# Patient Record
Sex: MALE | Race: WHITE | Employment: OTHER | URBAN - METROPOLITAN AREA
[De-identification: names, ages, dates, MRNs, and addresses within clinical notes are randomized per-mention and may not be internally consistent; named-entity substitution may affect disease eponyms.]

---

## 2018-08-22 RX ORDER — OMEGA-3-ACID ETHYL ESTERS 1 G/1
1 CAPSULE, LIQUID FILLED ORAL 2 TIMES DAILY
COMMUNITY

## 2018-08-23 ENCOUNTER — HOSPITAL ENCOUNTER (OUTPATIENT)
Facility: CLINIC | Age: 78
Discharge: HOME | End: 2018-08-23
Attending: OPHTHALMOLOGY | Admitting: OPHTHALMOLOGY
Payer: MEDICARE

## 2018-08-23 ENCOUNTER — ANESTHESIA EVENT (OUTPATIENT)
Dept: SURGERY | Facility: CLINIC | Age: 78
End: 2018-08-23
Payer: MEDICARE

## 2018-08-23 ENCOUNTER — ANESTHESIA (OUTPATIENT)
Dept: SURGERY | Facility: CLINIC | Age: 78
End: 2018-08-23
Payer: MEDICARE

## 2018-08-23 VITALS
DIASTOLIC BLOOD PRESSURE: 95 MMHG | TEMPERATURE: 97.6 F | SYSTOLIC BLOOD PRESSURE: 148 MMHG | HEIGHT: 70 IN | BODY MASS INDEX: 28.77 KG/M2 | WEIGHT: 201 LBS | RESPIRATION RATE: 18 BRPM | OXYGEN SATURATION: 99 %

## 2018-08-23 LAB — GLUCOSE BLDC GLUCOMTR-MCNC: 128 MG/DL (ref 70–99)

## 2018-08-23 PROCEDURE — 25000128 H RX IP 250 OP 636: Performed by: NURSE ANESTHETIST, CERTIFIED REGISTERED

## 2018-08-23 PROCEDURE — 27210794 ZZH OR GENERAL SUPPLY STERILE: Performed by: OPHTHALMOLOGY

## 2018-08-23 PROCEDURE — 36000101 ZZH EYE SURGERY LEVEL 3 1ST 30 MIN: Performed by: OPHTHALMOLOGY

## 2018-08-23 PROCEDURE — 25000128 H RX IP 250 OP 636: Performed by: ANESTHESIOLOGY

## 2018-08-23 PROCEDURE — 82962 GLUCOSE BLOOD TEST: CPT

## 2018-08-23 PROCEDURE — 71000028 ZZH EYE RECOVERY PHASE 2 EACH 15 MINS: Performed by: OPHTHALMOLOGY

## 2018-08-23 PROCEDURE — 25000128 H RX IP 250 OP 636: Performed by: OPHTHALMOLOGY

## 2018-08-23 PROCEDURE — 93005 ELECTROCARDIOGRAM TRACING: CPT

## 2018-08-23 PROCEDURE — 40000170 ZZH STATISTIC PRE-PROCEDURE ASSESSMENT II: Performed by: OPHTHALMOLOGY

## 2018-08-23 PROCEDURE — 93010 ELECTROCARDIOGRAM REPORT: CPT | Performed by: INTERNAL MEDICINE

## 2018-08-23 PROCEDURE — 99207 ZZC NON-BILLABLE SERV PER CHARTING: CPT | Performed by: NURSE PRACTITIONER

## 2018-08-23 PROCEDURE — 36000102 ZZH EYE SURGERY LEVEL 3 EA 15 ADDTL MIN: Performed by: OPHTHALMOLOGY

## 2018-08-23 PROCEDURE — 37000009 ZZH ANESTHESIA TECHNICAL FEE, EACH ADDTL 15 MIN: Performed by: OPHTHALMOLOGY

## 2018-08-23 PROCEDURE — 25000125 ZZHC RX 250: Performed by: OPHTHALMOLOGY

## 2018-08-23 PROCEDURE — 37000008 ZZH ANESTHESIA TECHNICAL FEE, 1ST 30 MIN: Performed by: OPHTHALMOLOGY

## 2018-08-23 PROCEDURE — V2632 POST CHMBR INTRAOCULAR LENS: HCPCS | Performed by: OPHTHALMOLOGY

## 2018-08-23 DEVICE — EYE IMP IOL AMO PCL TECNIS ZCB00 17.5: Type: IMPLANTABLE DEVICE | Site: EYE | Status: FUNCTIONAL

## 2018-08-23 RX ORDER — PROPARACAINE HYDROCHLORIDE 5 MG/ML
1 SOLUTION/ DROPS OPHTHALMIC ONCE
Status: DISCONTINUED | OUTPATIENT
Start: 2018-08-23 | End: 2018-08-23 | Stop reason: HOSPADM

## 2018-08-23 RX ORDER — PHENYLEPHRINE HYDROCHLORIDE 25 MG/ML
1 SOLUTION/ DROPS OPHTHALMIC
Status: COMPLETED | OUTPATIENT
Start: 2018-08-23 | End: 2018-08-23

## 2018-08-23 RX ORDER — PROPARACAINE HYDROCHLORIDE 5 MG/ML
1 SOLUTION/ DROPS OPHTHALMIC ONCE
Status: COMPLETED | OUTPATIENT
Start: 2018-08-23 | End: 2018-08-23

## 2018-08-23 RX ORDER — SODIUM CHLORIDE, SODIUM LACTATE, POTASSIUM CHLORIDE, CALCIUM CHLORIDE 600; 310; 30; 20 MG/100ML; MG/100ML; MG/100ML; MG/100ML
500 INJECTION, SOLUTION INTRAVENOUS CONTINUOUS
Status: DISCONTINUED | OUTPATIENT
Start: 2018-08-23 | End: 2018-08-23 | Stop reason: HOSPADM

## 2018-08-23 RX ORDER — ONDANSETRON 2 MG/ML
INJECTION INTRAMUSCULAR; INTRAVENOUS PRN
Status: DISCONTINUED | OUTPATIENT
Start: 2018-08-23 | End: 2018-08-23

## 2018-08-23 RX ORDER — FENTANYL CITRATE 50 UG/ML
INJECTION, SOLUTION INTRAMUSCULAR; INTRAVENOUS PRN
Status: DISCONTINUED | OUTPATIENT
Start: 2018-08-23 | End: 2018-08-23

## 2018-08-23 RX ORDER — LIDOCAINE HYDROCHLORIDE 10 MG/ML
INJECTION, SOLUTION EPIDURAL; INFILTRATION; INTRACAUDAL; PERINEURAL PRN
Status: DISCONTINUED | OUTPATIENT
Start: 2018-08-23 | End: 2018-08-23 | Stop reason: HOSPADM

## 2018-08-23 RX ORDER — DICLOFENAC SODIUM 1 MG/ML
1 SOLUTION/ DROPS OPHTHALMIC
Status: COMPLETED | OUTPATIENT
Start: 2018-08-23 | End: 2018-08-23

## 2018-08-23 RX ORDER — CYCLOPENTOLATE HYDROCHLORIDE 10 MG/ML
1 SOLUTION/ DROPS OPHTHALMIC
Status: COMPLETED | OUTPATIENT
Start: 2018-08-23 | End: 2018-08-23

## 2018-08-23 RX ORDER — MOXIFLOXACIN 5 MG/ML
1 SOLUTION/ DROPS OPHTHALMIC
Status: COMPLETED | OUTPATIENT
Start: 2018-08-23 | End: 2018-08-23

## 2018-08-23 RX ORDER — TROPICAMIDE 10 MG/ML
1 SOLUTION/ DROPS OPHTHALMIC
Status: COMPLETED | OUTPATIENT
Start: 2018-08-23 | End: 2018-08-23

## 2018-08-23 RX ORDER — BALANCED SALT SOLUTION 6.4; .75; .48; .3; 3.9; 1.7 MG/ML; MG/ML; MG/ML; MG/ML; MG/ML; MG/ML
SOLUTION OPHTHALMIC PRN
Status: DISCONTINUED | OUTPATIENT
Start: 2018-08-23 | End: 2018-08-23 | Stop reason: HOSPADM

## 2018-08-23 RX ORDER — TETRACAINE HYDROCHLORIDE 5 MG/ML
SOLUTION OPHTHALMIC PRN
Status: DISCONTINUED | OUTPATIENT
Start: 2018-08-23 | End: 2018-08-23 | Stop reason: HOSPADM

## 2018-08-23 RX ORDER — PROPOFOL 10 MG/ML
INJECTION, EMULSION INTRAVENOUS PRN
Status: DISCONTINUED | OUTPATIENT
Start: 2018-08-23 | End: 2018-08-23

## 2018-08-23 RX ADMIN — DICLOFENAC SODIUM 1 DROP: 1 SOLUTION OPHTHALMIC at 07:48

## 2018-08-23 RX ADMIN — CYCLOPENTOLATE HYDROCHLORIDE 1 DROP: 10 SOLUTION/ DROPS OPHTHALMIC at 07:29

## 2018-08-23 RX ADMIN — DICLOFENAC SODIUM 1 DROP: 1 SOLUTION OPHTHALMIC at 07:29

## 2018-08-23 RX ADMIN — PHENYLEPHRINE HYDROCHLORIDE 1 DROP: 2.5 SOLUTION/ DROPS OPHTHALMIC at 07:48

## 2018-08-23 RX ADMIN — FENTANYL CITRATE 50 MCG: 50 INJECTION, SOLUTION INTRAMUSCULAR; INTRAVENOUS at 08:43

## 2018-08-23 RX ADMIN — PHENYLEPHRINE HYDROCHLORIDE 1 DROP: 2.5 SOLUTION/ DROPS OPHTHALMIC at 07:29

## 2018-08-23 RX ADMIN — MIDAZOLAM 1 MG: 1 INJECTION INTRAMUSCULAR; INTRAVENOUS at 08:43

## 2018-08-23 RX ADMIN — ONDANSETRON 4 MG: 2 INJECTION INTRAMUSCULAR; INTRAVENOUS at 08:49

## 2018-08-23 RX ADMIN — TROPICAMIDE 1 DROP: 10 SOLUTION/ DROPS OPHTHALMIC at 07:48

## 2018-08-23 RX ADMIN — PROPOFOL 30 MG: 10 INJECTION, EMULSION INTRAVENOUS at 08:49

## 2018-08-23 RX ADMIN — DICLOFENAC SODIUM 1 DROP: 1 SOLUTION OPHTHALMIC at 07:38

## 2018-08-23 RX ADMIN — CYCLOPENTOLATE HYDROCHLORIDE 1 DROP: 10 SOLUTION/ DROPS OPHTHALMIC at 07:48

## 2018-08-23 RX ADMIN — MOXIFLOXACIN 1 DROP: 5 SOLUTION/ DROPS OPHTHALMIC at 07:48

## 2018-08-23 RX ADMIN — CYCLOPENTOLATE HYDROCHLORIDE 1 DROP: 10 SOLUTION/ DROPS OPHTHALMIC at 07:38

## 2018-08-23 RX ADMIN — MOXIFLOXACIN 1 DROP: 5 SOLUTION/ DROPS OPHTHALMIC at 07:29

## 2018-08-23 RX ADMIN — PHENYLEPHRINE HYDROCHLORIDE 1 DROP: 2.5 SOLUTION/ DROPS OPHTHALMIC at 07:38

## 2018-08-23 RX ADMIN — TROPICAMIDE 1 DROP: 10 SOLUTION/ DROPS OPHTHALMIC at 07:38

## 2018-08-23 RX ADMIN — TROPICAMIDE 1 DROP: 10 SOLUTION/ DROPS OPHTHALMIC at 07:29

## 2018-08-23 RX ADMIN — MOXIFLOXACIN 1 DROP: 5 SOLUTION/ DROPS OPHTHALMIC at 07:38

## 2018-08-23 RX ADMIN — PROPARACAINE HYDROCHLORIDE 1 DROP: 5 SOLUTION/ DROPS OPHTHALMIC at 07:29

## 2018-08-23 RX ADMIN — SODIUM CHLORIDE, POTASSIUM CHLORIDE, SODIUM LACTATE AND CALCIUM CHLORIDE 500 ML: 600; 310; 30; 20 INJECTION, SOLUTION INTRAVENOUS at 08:00

## 2018-08-23 ASSESSMENT — LIFESTYLE VARIABLES: TOBACCO_USE: 0

## 2018-08-23 ASSESSMENT — COPD QUESTIONNAIRES: COPD: 0

## 2018-08-23 ASSESSMENT — ENCOUNTER SYMPTOMS
ORTHOPNEA: 0
DYSRHYTHMIAS: 0
SEIZURES: 1

## 2018-08-23 NOTE — PROGRESS NOTES
1024hr - Pt transferred to family car in w/chair by CNA. Pt denied any pain at time of dc.   Pt and his wife verbalized that they were heading straight to ER at [facility] [place]  DC/follow up teaching was completed with Pts Spouse earlier on. Pts family

## 2018-08-23 NOTE — PROGRESS NOTES
Immediate follow up of 12L EKG discussedw/pt and his family present by ER ANP . Pt opted to follow up in [facility].

## 2018-08-23 NOTE — ANESTHESIA PREPROCEDURE EVALUATION
Procedure: Procedure(s):  PHACOEMULSIFICATION CLEAR CORNEA WITH STANDARD INTRAOCULAR LENS IMPLANT  Preop diagnosis: RIGHT EYE CATARACT    No Known Allergies  Past Medical History:   Diagnosis Date     Basal cell carcinoma of skin      Bipolar 2 disorder (H)      BPH (benign prostatic hyperplasia)      Diabetes (H)      Epilepsy (H)      Hyperlipemia      Hypertension      Recurrent seizures (H)      Past Surgical History:   Procedure Laterality Date     CIRCUMCISION  12/19/2001     ENT SURGERY      tonsillectomy     excision of basal cell carcinoma       excision of squamous cell carcinoma       HERNIA REPAIR       Social History   Substance Use Topics     Smoking status: Former Smoker     Quit date: 1/1/1968     Smokeless tobacco: Never Used     Alcohol use Yes      Comment: 1 can of beer per week     Prior to Admission medications    Medication Sig Start Date End Date Taking? Authorizing Provider   ASPIRIN PO Take 81 mg by mouth daily   Yes Reported, Patient   Docusate Sodium (COLACE PO) Take 100 mg by mouth 2 times daily   Yes Reported, Patient   DOXEPIN HCL PO Take 10 mg by mouth At Bedtime   Yes Reported, Patient   LISINOPRIL PO Take 10 mg by mouth daily   Yes Reported, Patient   METFORMIN HCL PO Take 1,000 mg by mouth 2 times daily (with meals)   Yes Reported, Patient   OLANZAPINE PO Take 10 mg by mouth daily   Yes Reported, Patient   omega-3 acid ethyl esters (LOVAZA) 1 g capsule Take 1 g by mouth 2 times daily   Yes Reported, Patient   PHENYTOIN PO Take 100 mg by mouth 2 times daily   Yes Reported, Patient   psyllium (METAMUCIL) 58.6 % POWD Take by mouth daily   Yes Reported, Patient   SIMVASTATIN PO Take 40 mg by mouth At Bedtime   Yes Reported, Patient   Multiple Vitamins-Minerals (CENTRUM SILVER PO)     Reported, Patient   TAMSULOSIN HCL PO Take 0.4 mg by mouth    Reported, Patient     Current Facility-Administered Medications Ordered in Epic   Medication Dose Route Frequency Last Rate Last Dose      lactated ringers infusion  500 mL Intravenous Continuous 25 mL/hr at 08/23/18 0800 500 mL at 08/23/18 0800     lidocaine (AKTEN) ophthalmic gel 0.5 mL  0.5 mL Ophthalmic Once         lidocaine 1 % 1 mL  1 mL Other Q1H PRN         povidone-iodine 5 % ophthalmic solution 1 drop  1 drop Ophthalmic Once         proparacaine (ALCAINE) 0.5 % ophthalmic solution 1 drop  1 drop Ophthalmic Once         sodium chloride (PF) 0.9% PF flush 3 mL  3 mL Intracatheter Q1H PRN         sodium chloride (PF) 0.9% PF flush 3 mL  3 mL Intracatheter Q8H         No current Epic-ordered outpatient prescriptions on file.       lactated ringers 500 mL (08/23/18 0800)     Wt Readings from Last 1 Encounters:   08/22/18 91.2 kg (201 lb)     Temp Readings from Last 1 Encounters:   08/23/18 36.4  C (97.6  F) (Temporal)     BP Readings from Last 6 Encounters:   08/23/18 (!) 164/94     Pulse Readings from Last 4 Encounters:   No data found for Pulse     Resp Readings from Last 1 Encounters:   08/23/18 18     SpO2 Readings from Last 1 Encounters:   08/23/18 100%     RECENT LABS:     ECG:     STRESS ECHO:   Final Impression:  1. Normal baseline EKG.   2. Normal Giorgi protocol stress test.   3. Negative stress echocardiogram without findings of ischemic   cardiomyopathy.   4. Aortic valve sclerosis without stenosis.   5. Mild enlargement of the ascending aorta at 4.0 cm.     Anesthesia Evaluation     . Pt has had prior anesthetic.     No history of anesthetic complications          ROS/MED HX    ENT/Pulmonary:      (-) tobacco use, asthma, COPD, sleep apnea and recent URI   Neurologic:     (+)seizures last seizure: 20 yrs ago    (-) CVA   Cardiovascular:     (+) Dyslipidemia, hypertension----. : . . . :. .      (-) angina, CAD, orthopnea/PND, syncope, arrhythmias, irregular heartbeat/palpitations, valvular problems/murmurs and angina   METS/Exercise Tolerance:  >4 METS   Hematologic:         Musculoskeletal:         GI/Hepatic:        (-) GERD and  liver disease   Renal/Genitourinary:     (+) BPH,    (-) renal disease   Endo:     (+) type II DM Not using insulin .   (-) thyroid disease   Psychiatric:     (+) psychiatric history bipolar      Infectious Disease:         Malignancy:   (+) Malignancy History of Skin          Other:                     Physical Exam      Airway   Mallampati: II  TM distance: >3 FB  Neck ROM: full    Dental   (+) caps    Cardiovascular   Rhythm and rate: regular and normal  (-) no murmur    Pulmonary    breath sounds clear to auscultation(-) no wheezes                    Anesthesia Plan      History & Physical Review  History and physical reviewed and following examination; no interval change.    ASA Status:  3 .    NPO Status:  > 8 hours    Plan for MAC Reason for MAC:  Procedure to face, neck, head or breast  PONV prophylaxis:  Ondansetron (or other 5HT-3)       Postoperative Care  Postoperative pain management:  Multi-modal analgesia.      Consents  Anesthetic plan, risks, benefits and alternatives discussed with:  Patient..

## 2018-08-23 NOTE — PROGRESS NOTES
0940hr - MDA Jonah now rounding on pt - arrhythmia findings noted intraOperatively discussed.  12L EKG ordered.  0950hr - 12L EKG indicative of Atrial Flutter.  1000hr - ER Nurse Practitioner now rounding on pt re: findings of 12LEKG. Pts wife present with pt.

## 2018-08-23 NOTE — DISCHARGE INSTRUCTIONS
[facility] Anesthesia Eye Care Center Discharge  Instructions  Anesthesia (Eye Care Center)   Adult Discharge Instructions    For 24 hours after surgery    1. Get plenty of rest.  Make arrangements to have a responsible adult stay with you for at least 24 hours after you leave the hospital.  2. Do not drive or use heavy equipment for 24 hours.    3. Do not drink alcohol for 24 hours.  4. Do not sign legal documents or make important decisions for 24 hours.  5. Avoid strenuous or risky activities. You may feel lightheaded.  If so, sit for a few minutes before standing.  Have someone help you get up.   6. Conscious sedation patients may resume a regular diet..  7. Any questions of medical nature, call your physician.    [facility]  Cataract Surgery Discharge Instructions  [facility] MD TRACY Harkins MD J. Hasan, MD C. Nichols, MD J. O'Neill, MD S. Schaefer, MD J. Stephens, MD        Start using drops when you arrive at home today    Ofloxacin (Tan top) - one drop in surgical eye 3 times per day until gone.   Prednisolone acetate 1.0% (pink or white top) - one drop in surgical eye 3 times per day until gone.  Ketorolac or Diclofenac (Grey top) - one drop in surgical eye 3 times per day until gone.   (Dr. Bee's patients should use all drops 4 times daily)      Place shield over surgical eye at bedtime for 3 nights.      The eye will feel itchy, scratchy, and vision will be blurred, you may take Tylenol for the scratchy feeling if this is bothersome.      No eye rubbing or swimming for I week.      You may resume all prescription medications as directed by your primary doctor.      Call if increasing pain, progressively worsening vision or worsening redness of surgical eye.      On-call doctor can be reached at [phone].

## 2018-08-23 NOTE — IP AVS SNAPSHOT
[facility]    [address]    [place] [address]    Phone:  [phone]    Fax:  [phone]                                       After Visit Summary   8/23/2018    Riki Sung    MRN: [id number]           After Visit Summary Signature Page     I have received my discharge instructions, and my questions have been answered. I have discussed any challenges I see with this plan with the nurse or doctor.    ..........................................................................................................................................  Patient/Patient Representative Signature      ..........................................................................................................................................  Patient Representative Print Name and Relationship to Patient    ..................................................               ................................................  Date                                            Time    ..........................................................................................................................................  Reviewed by Signature/Title    ...................................................              ..............................................  Date                                                            Time

## 2018-08-23 NOTE — BRIEF OP NOTE
[facility]  Ophthalmology Brief Operative Note    Pre-operative diagnosis: RIGHT EYE CATARACT   Post-operative diagnosis Same   Operative Eye Right   Procedure: Procedure(s):  PHACOEMULSIFICATION CLEAR CORNEA WITH STANDARD INTRAOCULAR LENS IMPLANT   Surgeon: Ramiro Bee MD   Anesthesia: Combined MAC with Retrobulbar    Estimated blood loss: Trace    Complications: None   Condition: Stable   Comments: See dictated operative report for full details         Implant Name Type Inv. Item Serial No.  Lot No. LRB No. Used   EYE IMP IOL SELVIN PCL TECNIS ZCB00 17.5 Lens/Eye Implant EYE IMP IOL SELVIN PCL TECNIS ZCB00 17.5 [id number] [employer]   Right 1

## 2018-08-23 NOTE — ANESTHESIA CARE TRANSFER NOTE
Patient: Riki Sung    Procedure(s):  RIGHT EYE PHACOEMULSIFICATION CLEAR CORNEA WITH STANDARD INTRAOCULAR LENS IMPLANT - Wound Class: I-Clean    Diagnosis: RIGHT EYE CATARACT  Diagnosis Additional Information: No value filed.    Anesthesia Type:   MAC     Note:  Airway :Room Air  Patient transferred to:PACU  Handoff Report: Identifed the Patient, Identified the Reponsible Provider, Reviewed the pertinent medical history, Discussed the surgical course, Reviewed Intra-OP anesthesia mangement and issues during anesthesia, Set expectations for post-procedure period and Allowed opportunity for questions and acknowledgement of understanding      Vitals: (Last set prior to Anesthesia Care Transfer)    CRNA VITALS  8/23/2018 0847 - 8/23/2018 0917      8/23/2018             Resp Rate (set): 10                Electronically Signed By: JOSE L Brambila CRNA  August 23, 2018  9:17 AM

## 2018-08-23 NOTE — PROGRESS NOTES
[facility]    Hospitalist Consultation    Date of Admission:  8/23/2018    Assessment & Plan   Riki Sung is a 77 year old male who presented to [facility] on 8/23/2018 for planned outpatient cataract surgery. Intra-operatively sustained atrial flutter was noted and the Hospitalist Service was consulted for consideration of further evaluation.     Atrial fibrillation, concern for new onset  Mr. Sung states he was in his usual state of health upon presentation this morning. He offers no specific concerns of issues over the past several weeks. In 2016 he had a negative cardiac work-up after his brother underwent open-heart surgery. His brother recently had a stroke, but he does not know the stroke etiology. He specifically denies chest pain and shortness of breath. He appears decisional.     Given the fact this is likely new I have offered Mr. Sung direct admission for observation and further evaluation and monitoring, or escort to the [facility] Emergency Department for evaluation. At this time Mr. Sung, his wife, and son are in agreement they would like to decline further evaluation and management at [facility] and go to the [facility] Emergency Department. I have made them aware that leaving here is not without risk and all risk of events after leaving [facility] are at their discretion and they all agree to accept the risk. Mrs. Sung is willing to sign the AMA form.     I will call [facility] for Provider- to- Provider hand-off at the request of Mr. Sung.     JOSE L Bethea, CNP  Hospitalist Service, House Officer  [facility]     Text Page  Pager: [phone]

## 2018-08-23 NOTE — OP NOTE
Procedure Date: 08/23/2018      PRE-OPERATIVE DIAGNOSES:     1.  Nuclear cataract, right eye.   2.  Diabetes.       POST-OPERATIVE DIAGNOSES:     1.  Nuclear cataract, right eye.   2.  Diabetes.   OPERATION:  Phacoemulsification with posterior chamber intraocular lens, right eye.           ANESTHESIA:  Retrobulbar.      ESTIMATED BLOOD LOSS:  Trace.       PATIENT IDENTIFICATION:  The patient is a 77-year-old diabetic male who has noted progressive decreased vision in his right eye.  After evaluation, he was found to have cataract as the primary cause of his 20/40 vision.  After a complete discussion of the risks and benefits of the procedure was undertaken with the patient, he elected to have his cataract removed.  He understands that his final vision and healing will be limited by his diabetes.       DESCRIPTION OF PROCEDURE:    Retrobulbar anesthesia was given in the operating room.  This consisted of a 50/50 mixture of 2% lidocaine and 0.75% bupivacaine.  A [place] balloon was placed over the operative eye for a few minutes.  The operative eye was then prepped and draped in the usual sterile fashion, including drops of Betadine on its surface.       A paracentesis tract was placed through which 1% non-preserved lidocaine was instilled.  Viscoat was used to fill the anterior chamber.  A temporal clear corneal incision was made with a 2.4 mm keratome.  A circular continuous capsulorrhexis was fashioned with a cystotome and Utrata forceps.  Hydrodissection of the lens nucleus was performed and this was phacoemulsified using a modified stop and chop technique.  The remaining cortex was removed using automated irrigation and aspiration.  The posterior capsule was then filled with Provisc and a foldable SELVIN model ZCB00 17.5 diopter lens was inserted into the capsular bag.  The remaining viscoelastic was removed using the automated I and A tip.  The anterior chamber was then reformed using balanced salt solution.  The  corneal wound was checked and found to be well sealed.         Drops of Betadine and Vigamox were placed over the operative eye as was a clear shield.  The patient returned to the recovery area in stable condition.         RACHELLE MARQUEZ MD, FACS             D: 2018   T: 2018   MT: MARYANNE      Name:     KIRSTY GALVEZ   MRN:      [id number]        Account:        [id number]   :      1940           Procedure Date: 2018      Document: [id number]

## 2018-08-23 NOTE — IP AVS SNAPSHOT
MRN:[id number]                      After Visit Summary   8/23/2018    Riki Sung    MRN: [id number]           Thank you!     Thank you for choosing [facility] for your care. Our goal is always to provide you with excellent care. Hearing back from our patients is one way we can continue to improve our services. Please take a few minutes to complete the written survey that you may receive in the mail after you visit with us. Thank you!        Patient Information     Date Of Birth          1940        Designated Caregiver       Most Recent Value    Caregiver    Will someone help with your care after discharge? yes    Name of designated caregiver Marian - Spouse    Phone number of caregiver [phone] - cell    Caregiver address [place]      About your hospital stay     You were admitted on:  August 23, 2018 You last received care in the:  [facility] [place]    You were discharged on:  August 23, 2018       Who to Call     For medical emergencies, please call 911.  For non-urgent questions about your medical care, please call your primary care provider or clinic, [phone]  For questions related to your surgery, please call your surgery clinic        Attending Provider     Provider Ramiro Juarez MD Ophthalmology       Primary Care Provider Office Phone # Fax #    Ashley Mejia [phone]      Further instructions from your care team       [facility] Anesthesia [facility] Care Center Discharge  Instructions  Anesthesia (Eye Care Center)   Adult Discharge Instructions    For 24 hours after surgery    1. Get plenty of rest.  Make arrangements to have a responsible adult stay with you for at least 24 hours after you leave the hospital.  2. Do not drive or use heavy equipment for 24 hours.    3. Do not drink alcohol for 24 hours.  4. Do not sign legal documents or make important decisions for 24 hours.  5. Avoid strenuous or risky  "activities. You may feel lightheaded.  If so, sit for a few minutes before standing.  Have someone help you get up.   6. Conscious sedation patients may resume a regular diet..  7. Any questions of medical nature, call your physician.    [facility]  Cataract Surgery Discharge Instructions  [facility] MD TRACY Harkins MD J. Hasan, MD C. Nichols, MD J. O'Neill, MD S. Schaefer, MD J. Stephens, MD        Start using drops when you arrive at home today    Ofloxacin (Tan top) - one drop in surgical eye 3 times per day until gone.   Prednisolone acetate 1.0% (pink or white top) - one drop in surgical eye 3 times per day until gone.  Ketorolac or Diclofenac (Grey top) - one drop in surgical eye 3 times per day until gone.   (Dr. Bee's patients should use all drops 4 times daily)      Place shield over surgical eye at bedtime for 3 nights.      The eye will feel itchy, scratchy, and vision will be blurred, you may take Tylenol for the scratchy feeling if this is bothersome.      No eye rubbing or swimming for I week.      You may resume all prescription medications as directed by your primary doctor.      Call if increasing pain, progressively worsening vision or worsening redness of surgical eye.      On-call doctor can be reached at [phone].    Pending Results     No orders found from 8/21/2018 to 8/24/2018.            Admission Information     Date & Time Provider Department Dept. Phone    8/23/2018 Ramiro Bee MD [facility] [place] [phone]      Your Vitals Were     Blood Pressure Temperature Respirations Height Weight Pulse Oximetry    159/91 97.6  F (36.4  C) (Temporal) 16 1.785 m (5' 10.28\") 91.2 kg (201 lb) 97%    BMI (Body Mass Index)                   28.61 kg/m2           MyChart Information     [facility] [employer] lets you send messages to your doctor, view your test results, renew your " "prescriptions, schedule appointments and more. To sign up, go to [email or web address] . Click on \"Log in\" on the left side of the screen, which will take you to the Welcome page. Then click on \"Sign up Now\" on the right side of the page.     You will be asked to enter the access code listed below, as well as some personal information. Please follow the directions to create your username and password.     Your access code is: [email or web address]  Expires: 2018  9:26 AM     Your access code will  in 90 days. If you need help or a new code, please call your [facility] clinic or [phone].        Care EveryWhere ID     This is your Care EveryWhere ID. This could be used by other organizations to access your [facility] medical records  [id number]        Equal Access to Services     CHERYL GUAJARDO : Simon Batista, sybil caba, janet edmond, suzette galvan . So [facility] [phone].    ATENCIÓN: Si habla español, tiene a rankin disposición servicios gratuitos de asistencia lingüística. Llame al [phone].    We comply with applicable federal civil rights laws and [place] laws. We do not discriminate on the basis of race, color, national origin, age, disability, sex, sexual orientation, or gender identity.               Review of your medicines      UNREVIEWED medicines. Ask your doctor about these medicines        Dose / Directions    ASPIRIN PO        Dose:  81 mg   Take 81 mg by mouth daily   Refills:  0       CENTRUM SILVER PO        Refills:  0       COLACE PO        Dose:  100 mg   Take 100 mg by mouth 2 times daily   Refills:  0       DOXEPIN HCL PO        Dose:  10 mg   Take 10 mg by mouth At Bedtime   Refills:  0       LISINOPRIL PO        Dose:  10 mg   Take 10 mg by mouth daily   Refills:  0       METFORMIN HCL PO        Dose:  1000 mg   Take 1,000 mg by mouth 2 times daily (with meals)   Refills:  0       OLANZAPINE PO        Dose:  10 mg   Take 10 " mg by mouth daily   Refills:  0       omega-3 acid ethyl esters 1 g capsule   Commonly known as:  Lovaza        Dose:  1 g   Take 1 g by mouth 2 times daily   Refills:  0       PHENYTOIN PO        Dose:  100 mg   Take 100 mg by mouth 2 times daily   Refills:  0       psyllium 58.6 % Powd   Commonly known as:  METAMUCIL        Take by mouth daily   Refills:  0       SIMVASTATIN PO        Dose:  40 mg   Take 40 mg by mouth At Bedtime   Refills:  0       TAMSULOSIN HCL PO        Dose:  0.4 mg   Take 0.4 mg by mouth   Refills:  0                Protect others around you: Learn how to safely use, store and throw away your medicines at www.disposemymeds.org.             Medication List: This is a list of all your medications and when to take them. Check marks below indicate your daily home schedule. Keep this list as a reference.      Medications           Morning Afternoon Evening Bedtime As Needed    ASPIRIN PO   Take 81 mg by mouth daily                                CENTRUM SILVER PO                                COLACE PO   Take 100 mg by mouth 2 times daily                                DOXEPIN HCL PO   Take 10 mg by mouth At Bedtime                                LISINOPRIL PO   Take 10 mg by mouth daily                                METFORMIN HCL PO   Take 1,000 mg by mouth 2 times daily (with meals)                                OLANZAPINE PO   Take 10 mg by mouth daily                                omega-3 acid ethyl esters 1 g capsule   Commonly known as:  Lovaza   Take 1 g by mouth 2 times daily                                PHENYTOIN PO   Take 100 mg by mouth 2 times daily                                psyllium 58.6 % Powd   Commonly known as:  METAMUCIL   Take by mouth daily                                SIMVASTATIN PO   Take 40 mg by mouth At Bedtime                                TAMSULOSIN HCL PO   Take 0.4 mg by mouth

## 2018-08-23 NOTE — ANESTHESIA POSTPROCEDURE EVALUATION
Patient: Riki Sung    Procedure(s):  RIGHT EYE PHACOEMULSIFICATION CLEAR CORNEA WITH STANDARD INTRAOCULAR LENS IMPLANT - Wound Class: I-Clean    Diagnosis:RIGHT EYE CATARACT  Diagnosis Additional Information: No value filed.    Anesthesia Type:  MAC    Note:  Anesthesia Post Evaluation    Patient location during evaluation: Bedside  Patient participation: Able to fully participate in evaluation  Level of consciousness: awake and alert  Pain management: adequate  Airway patency: patent  Cardiovascular status: acceptable  Respiratory status: acceptable  Hydration status: acceptable  PONV: none       Comments: Patient with telemetry consistent with atrial flutter intraoperatively, BP, HR, SpO2 stable.  Patient asymptomatic postoperatively, known murmur on cardiac exam but otherwise normal.  Asked hospitalist to evaluate to reisk stratify for anticoagulation and followup/further evaluation with primary care as outpatient. Appreciate help of hospitalist service.        Last vitals:  Vitals:    08/23/18 0723 08/23/18 0920 08/23/18 0951   BP: (!) 164/94 (!) 159/91 (!) 148/95   Resp: 18 16 18   Temp: 36.4  C (97.6  F)     SpO2: 100% 97% 99%         Electronically Signed By: Edgar Bruner MD  August 23, 2018  9:54 AM

## 2018-08-31 LAB — INTERPRETATION ECG - MUSE: NORMAL

## (undated) DEVICE — EYE PACK BVI READYPAK KIT #1

## (undated) DEVICE — GLOVE PROTEXIS MICRO 8.5  2D73PM85

## (undated) DEVICE — EYE KNIFE SLIT XSTAR VISITEC 2.4MM 45DEG BEVEL UP 373724

## (undated) DEVICE — GLOVE PROTEXIS MICRO 7.0  2D73PM70

## (undated) DEVICE — EYE PACK CUSTOM ANTERIOR 30DEG TIP CENTURION PPK6682-04

## (undated) DEVICE — EYE SHIELD PLASTIC

## (undated) DEVICE — TAPE MICROPORE 2"X1.5YD 1530S-2

## (undated) DEVICE — EYE SOL BSS 500ML

## (undated) DEVICE — EYE CANN IRR 25GA HYDRODISSECTING 585037

## (undated) DEVICE — LINEN TOWEL PACK X5 5464

## (undated) DEVICE — Device

## (undated) DEVICE — PACK CATARACT CUSTOM SO DALE SEY32CTFCX

## (undated) RX ORDER — LIDOCAINE HYDROCHLORIDE 10 MG/ML
INJECTION, SOLUTION EPIDURAL; INFILTRATION; INTRACAUDAL; PERINEURAL
Status: DISPENSED
Start: 2018-08-23

## (undated) RX ORDER — FENTANYL CITRATE 50 UG/ML
INJECTION, SOLUTION INTRAMUSCULAR; INTRAVENOUS
Status: DISPENSED
Start: 2018-08-23

## (undated) RX ORDER — EPINEPHRINE 1 MG/ML
INJECTION, SOLUTION, CONCENTRATE INTRAVENOUS
Status: DISPENSED
Start: 2018-08-23

## (undated) RX ORDER — ONDANSETRON 2 MG/ML
INJECTION INTRAMUSCULAR; INTRAVENOUS
Status: DISPENSED
Start: 2018-08-23

## (undated) RX ORDER — TETRACAINE HYDROCHLORIDE 5 MG/ML
SOLUTION OPHTHALMIC
Status: DISPENSED
Start: 2018-08-23